# Patient Record
Sex: FEMALE | Race: WHITE | NOT HISPANIC OR LATINO | URBAN - METROPOLITAN AREA
[De-identification: names, ages, dates, MRNs, and addresses within clinical notes are randomized per-mention and may not be internally consistent; named-entity substitution may affect disease eponyms.]

---

## 2017-05-27 ENCOUNTER — EMERGENCY (EMERGENCY)
Facility: HOSPITAL | Age: 37
LOS: 1 days | End: 2017-05-27
Attending: INTERNAL MEDICINE | Admitting: INTERNAL MEDICINE
Payer: COMMERCIAL

## 2017-05-27 VITALS
WEIGHT: 136.03 LBS | DIASTOLIC BLOOD PRESSURE: 65 MMHG | HEART RATE: 68 BPM | OXYGEN SATURATION: 100 % | SYSTOLIC BLOOD PRESSURE: 107 MMHG | HEIGHT: 70 IN | RESPIRATION RATE: 16 BRPM | TEMPERATURE: 98 F

## 2017-05-27 VITALS
OXYGEN SATURATION: 100 % | HEART RATE: 70 BPM | DIASTOLIC BLOOD PRESSURE: 68 MMHG | SYSTOLIC BLOOD PRESSURE: 112 MMHG | RESPIRATION RATE: 14 BRPM | TEMPERATURE: 98 F

## 2017-05-27 PROCEDURE — 99283 EMERGENCY DEPT VISIT LOW MDM: CPT

## 2017-05-27 PROCEDURE — 99282 EMERGENCY DEPT VISIT SF MDM: CPT

## 2017-05-27 NOTE — ED ADULT NURSE NOTE - OBJECTIVE STATEMENT
pt pregnant and was spotting on tuesday saw ob/gyn and told missed ab and to return for vag bleeding pt used 4 pads today passing clumps and cramping on exam small amt blood noted

## 2017-05-27 NOTE — ED PROVIDER NOTE - DETAILS:
Erasto Encarnacion MD - The scribe's documentation has been prepared under my direction and personally reviewed by me in its entirety. I confirm that the note above accurately reflects all work, treatment, procedures, and medical decision making performed by me.

## 2017-05-27 NOTE — ED PROVIDER NOTE - OBJECTIVE STATEMENT
36 y/o F pt presents to the ED c/o vaginal bleeding. Pt was approximately 7 weeks pregnant, and went to her OB/GYN earlier this week and it was confirmed that she was having a miscarriage. Pt had been spotting the past few days, but states the amount of vaginal bleeding increased today. Denies fever. No further complaints at this time. 36 y/o F pt presents to the ED c/o vaginal bleeding. Pt was approximately 7 weeks pregnant, and went to her OB/GYN earlier this week and it was confirmed that she was having a miscarriage. Pt had been spotting the past few days, but states the amount of vaginal bleeding increased today. Pt states "she passed clumps of blood." Denies fever. No further complaints at this time.

## 2017-05-27 NOTE — ED PROVIDER NOTE - MEDICAL DECISION MAKING DETAILS
miscarriage..abdo benign...pelvic with scant brb in vault...feels much better after bleeding/passing product all day..has ob that she is seeing tomorrow.no need for sono,etc

## 2017-05-27 NOTE — ED PROVIDER NOTE - NS ED MD SCRIBE ATTENDING SCRIBE SECTIONS
PAST MEDICAL/SURGICAL/SOCIAL HISTORY/HIV/DISPOSITION/PHYSICAL EXAM/VITAL SIGNS( Pullset)/REVIEW OF SYSTEMS/HISTORY OF PRESENT ILLNESS

## 2021-01-22 NOTE — ED ADULT NURSE NOTE - NS PRO AD NO ADVANCE DIRECTIVE
77F, [facility] of dementia, right hip fracture in rehab, sent to the emergency department for hypoxia. patient is a poor historian and has no current complaints. per her daughter-in-law (healthcare proxy) and Dr. Miramontes, patient recently tested negative for covid but was found to be hypoxic today. No
